# Patient Record
Sex: MALE | Employment: UNEMPLOYED | URBAN - METROPOLITAN AREA
[De-identification: names, ages, dates, MRNs, and addresses within clinical notes are randomized per-mention and may not be internally consistent; named-entity substitution may affect disease eponyms.]

---

## 2022-08-22 ENCOUNTER — HOSPITAL ENCOUNTER (EMERGENCY)
Facility: CLINIC | Age: 37
Discharge: HOME | End: 2022-08-22
Attending: EMERGENCY MEDICINE | Admitting: EMERGENCY MEDICINE
Payer: COMMERCIAL

## 2022-08-22 VITALS
BODY MASS INDEX: 25.77 KG/M2 | TEMPERATURE: 98.6 F | WEIGHT: 174 LBS | OXYGEN SATURATION: 99 % | RESPIRATION RATE: 16 BRPM | HEART RATE: 61 BPM | HEIGHT: 69 IN | SYSTOLIC BLOOD PRESSURE: 108 MMHG | DIASTOLIC BLOOD PRESSURE: 58 MMHG

## 2022-08-22 DIAGNOSIS — H66.90 ACUTE OTITIS MEDIA, UNSPECIFIED OTITIS MEDIA TYPE: ICD-10-CM

## 2022-08-22 DIAGNOSIS — H72.91 PERFORATION OF TYMPANIC MEMBRANE, RIGHT: ICD-10-CM

## 2022-08-22 PROCEDURE — 250N000013 HC RX MED GY IP 250 OP 250 PS 637: Performed by: EMERGENCY MEDICINE

## 2022-08-22 PROCEDURE — 99283 EMERGENCY DEPT VISIT LOW MDM: CPT

## 2022-08-22 RX ORDER — AMOXICILLIN 875 MG
875 TABLET ORAL 2 TIMES DAILY
Qty: 14 TABLET | Refills: 0 | Status: SHIPPED | OUTPATIENT
Start: 2022-08-22 | End: 2022-08-29

## 2022-08-22 RX ORDER — ACETAMINOPHEN 500 MG
1000 TABLET ORAL ONCE
Status: COMPLETED | OUTPATIENT
Start: 2022-08-22 | End: 2022-08-22

## 2022-08-22 RX ADMIN — ACETAMINOPHEN 1000 MG: 500 TABLET, FILM COATED ORAL at 12:18

## 2022-08-22 NOTE — ED PROVIDER NOTES
History   Chief Complaint:  Left ear pain    HPI   History supplemented by electronic chart review    Ladonna Rodriguez is a 37 year old male who presents for evaluation of left ear pain that began yesterday, though he also notes that his right ear has been bothering him for quite some time, and hearing is decreased out of his right ear for a long time.  Yesterday he tried to get his finger in his left ear to investigate the discomfort, was unable to do so, so he put a Q-tip in his left ear, which made the pain worse.  No drainage from his ear.  No fevers.  No injuries.  No blood thinner use.  No recent antibiotics.  He is not diabetic.  No balance troubles.      Review of Systems  Constitutional negative  Neurologic negative    Allergies:  Pork Derived Products     Medications:    salicylic acid 40 % MISC    Past Medical History:    Denies chronic medical conditions    Past Surgical History:    No past surgical history on file.     Family History:    family history is not on file.    Social History:  He works as a     Physical Exam     Patient Vitals for the past 24 hrs:   BP Temp Temp src Pulse Resp SpO2 Weight   08/22/22 1143 108/58 98.6  F (37  C) Oral 61 16 99 % 78.9 kg (174 lb)      Physical Exam  General: Nontoxic-appearing male sitting upright in fast-track 1  HENT: mucous membranes moist, L TM with significant erythema and poor light reflex, no apparent tympanic membrane perforation, left external ear canal normal, no otorrhea seen, R TM with evidence of perforation, no otorrhea, right external ear canal normal  CV: rate as above, regular rhythm  Resp: normal effort, speaks in full phrases, no stridor, no cough observed  GI: abdomen soft and nontender, no guarding  MSK: no bony tenderness, no mastoid tenderness, full range of motion of mandible  Skin: appropriately warm and dry, no neck crepitus  Neuro: alert, clear speech, oriented, ambulatory with steady gait, hearing grossly intact  Psych:  [facility]    Emergency Department Course   Emergency Department Course:  Reviewed:  I reviewed nursing notes, vitals, and past medical history    Assessments:  I obtained history and examined the patient as noted above.          Interventions:  Medications   acetaminophen (TYLENOL) tablet 1,000 mg (has no administration in time range)        Disposition:  Discharged    Impression & Plan    Medical Decision Making:  Regarding his left otalgia, which is his primary concern, he has evidence of erythema and a poor light reflex compatible with otitis media for which I have prescribed antibiotics.  This certainly possible that this is a nonbacterial infection or other inflammatory process, though given additional evidence of perforated right tympanic membrane I felt that antibiotics were reasonable.  No signs of external otitis, mastoiditis, neurologic impairment, necrotizing infection or other indication for further work-up or immediate specialist consultation, though I did provide referral information for local otolaryngologist for close follow-up.  This was reviewed with the patient and questions answered prior to his ambulatory discharge.  Use over-the-counter analgesics as needed.    Diagnosis:    ICD-10-CM    1. Acute otitis media, unspecified otitis media type  H66.90    2. Perforation of tympanic membrane, right  H72.91         Discharge Prescriptions:  New Prescriptions    AMOXICILLIN (AMOXIL) 875 MG TABLET    Take 1 tablet (875 mg) by mouth 2 times daily for 7 days       8/22/2022   MD Eli Acosta, Marco Wyatt MD  08/22/22 [id number]